# Patient Record
Sex: FEMALE | Race: WHITE | NOT HISPANIC OR LATINO | Employment: UNEMPLOYED | URBAN - NONMETROPOLITAN AREA
[De-identification: names, ages, dates, MRNs, and addresses within clinical notes are randomized per-mention and may not be internally consistent; named-entity substitution may affect disease eponyms.]

---

## 2019-11-15 ENCOUNTER — HISTORICAL (OUTPATIENT)
Dept: ADMINISTRATIVE | Facility: HOSPITAL | Age: 74
End: 2019-11-15

## 2022-11-04 DIAGNOSIS — D48.5 NEOPLASM OF UNCERTAIN BEHAVIOR OF SKIN: Primary | ICD-10-CM

## 2022-11-09 ENCOUNTER — OFFICE VISIT (OUTPATIENT)
Dept: SURGERY | Facility: CLINIC | Age: 77
End: 2022-11-09
Payer: MEDICARE

## 2022-11-09 DIAGNOSIS — D48.5 NEOPLASM OF UNCERTAIN BEHAVIOR OF SKIN: ICD-10-CM

## 2022-11-09 DIAGNOSIS — C44.92 CANCER OF SKIN, SQUAMOUS CELL: Primary | ICD-10-CM

## 2022-11-09 PROCEDURE — 99202 OFFICE O/P NEW SF 15 MIN: CPT | Mod: 25,S$GLB,, | Performed by: SURGERY

## 2022-11-09 PROCEDURE — 99202 PR OFFICE/OUTPT VISIT, NEW, LEVL II, 15-29 MIN: ICD-10-PCS | Mod: 25,S$GLB,, | Performed by: SURGERY

## 2022-11-09 PROCEDURE — 11622: ICD-10-PCS | Mod: S$GLB,,, | Performed by: SURGERY

## 2022-11-09 PROCEDURE — 11622 EXC S/N/H/F/G MAL+MRG 1.1-2: CPT | Mod: S$GLB,,, | Performed by: SURGERY

## 2022-11-09 RX ORDER — LEVOTHYROXINE SODIUM 25 UG/1
25 TABLET ORAL DAILY
COMMUNITY
Start: 2022-09-19

## 2022-11-09 RX ORDER — PANTOPRAZOLE SODIUM 40 MG/1
40 TABLET, DELAYED RELEASE ORAL DAILY
COMMUNITY
Start: 2022-08-18

## 2022-11-09 RX ORDER — AMLODIPINE BESYLATE 5 MG/1
5 TABLET ORAL DAILY
COMMUNITY
Start: 2022-09-16

## 2022-11-09 RX ORDER — UBIDECARENONE 30 MG
100 CAPSULE ORAL 3 TIMES DAILY
COMMUNITY

## 2022-11-09 RX ORDER — METOPROLOL SUCCINATE 100 MG/1
100 TABLET, EXTENDED RELEASE ORAL EVERY MORNING
COMMUNITY
Start: 2022-09-04

## 2022-11-09 RX ORDER — OXYBUTYNIN CHLORIDE 15 MG/1
15 TABLET, EXTENDED RELEASE ORAL DAILY
COMMUNITY
Start: 2022-08-18

## 2022-11-09 RX ORDER — FENOFIBRATE 134 MG/1
134 CAPSULE ORAL DAILY
COMMUNITY
Start: 2022-09-19

## 2022-11-09 NOTE — PROCEDURES
Excision squamous cell cancer anterior chest wall skin    Date/Time: 11/9/2022 2:30 PM  Performed by: Michel Gamboa MD  Authorized by: Michel Gamboa MD     Consent Done?:  Yes (Verbal)  Local anesthesia used?: Yes    Anesthesia:  Local infiltration  Local anesthetic:  Lidocaine 1% with epinephrine  Anesthetic total (ml):  9  : Malignant skin lesion.  Body area:  Neck / anterior thorax  Location modifier: Center.  Position:  Supine  Anesthesia:  Local infiltration  Local anesthetic:  Lidocaine 1% with epinephrine  Excision type:  Skin  Malignancy:  Malignant  Excision size (cm):  2  Scalpel size:  15  Incision type:  Elliptical  Specimens?: Yes     Specimens submitted to pathology.   Hemostasis was obtained.  Estimated blood loss (cc):  3  Sutures: 3-0 nylon.  Sterile dressings:  Gauze   Patient was discharged and will follow up for wound check and pathology results.

## 2022-11-09 NOTE — PROGRESS NOTES
History & Physical    SUBJECTIVE:     History of Present Illness:    77-year-old female referred by Dr. 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  Patient apparently had a couple of skin lesions removed in the lesion from the center of the chest showed to be a squamous cell carcinoma, well differentiated with the lesion extending to the base of the specimen.  She is here for re-excision.    Chief Complaint   Patient presents with    Other     Skin cancer right side         Review of patient's allergies indicates:  Review of patient's allergies indicates:   Allergen Reactions    Compazine [prochlorperazine]        Current Outpatient Medications on File Prior to Visit   Medication Sig Dispense Refill    amLODIPine (NORVASC) 5 MG tablet Take 5 mg by mouth once daily.      co-enzyme Q-10 30 mg capsule Take 100 mg by mouth 3 (three) times daily.      EUTHYROX 25 mcg tablet Take 25 mcg by mouth once daily.      fenofibrate micronized (LOFIBRA) 134 MG Cap Take 134 mg by mouth once daily.      metoprolol succinate (TOPROL-XL) 100 MG 24 hr tablet Take 100 mg by mouth every morning.      oxybutynin (DITROPAN XL) 15 MG TR24 Take 15 mg by mouth once daily.      pantoprazole (PROTONIX) 40 MG tablet Take 40 mg by mouth once daily.       No current facility-administered medications on file prior to visit.       Past Medical History:   Diagnosis Date    Hyperlipidemia     Hypertension      Past Surgical History:   Procedure Laterality Date     SECTION      CHOLECYSTECTOMY      FOOT SURGERY      KNEE SURGERY      MASTECTOMY Bilateral     REPAIR OF BILE DUCT       Family History   Problem Relation Age of Onset    Brain cancer Brother     Leukemia Brother        Social History     Socioeconomic History    Marital status: Single   Tobacco Use    Smoking status: Never    Smokeless tobacco: Never          ROS    OBJECTIVE:     There were no vitals filed for this visit.              Physical  Exam:  Physical Exam  Skin:            Comments: Two areas on the central upper chest are noted 1 on right central in 1 over the sternal area from previous shave biopsy.  It is the 1 over the sternal area that showed to be a squamous cell carcinoma.  This will be reexcised today.  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  See procedure note

## 2022-11-16 ENCOUNTER — OFFICE VISIT (OUTPATIENT)
Dept: SURGERY | Facility: CLINIC | Age: 77
End: 2022-11-16
Payer: MEDICARE

## 2022-11-16 DIAGNOSIS — Z98.890 POST-OPERATIVE STATE: Primary | ICD-10-CM

## 2022-11-16 PROCEDURE — 99024 POSTOP FOLLOW-UP VISIT: CPT | Mod: S$GLB,POP,, | Performed by: SURGERY

## 2022-11-16 PROCEDURE — 99024 PR POST-OP FOLLOW-UP VISIT: ICD-10-PCS | Mod: S$GLB,POP,, | Performed by: SURGERY

## 2022-11-16 NOTE — PROGRESS NOTES
HPI:  Postoperative revisit status post excision of malignant skin lesion from her chest wall squamous cell.  Path report shows no residual malignancy, dermal scar    PHYSICAL EXAM:  Sutures removed and Steri-Strips applied.  No redness or drainage noted  ASSESSMENT:    Stable postop  PLAN:      Revisit as needed

## 2025-03-07 ENCOUNTER — HOSPITAL ENCOUNTER (OUTPATIENT)
Dept: RADIOLOGY | Facility: HOSPITAL | Age: 80
Discharge: HOME | End: 2025-03-07
Attending: INTERNAL MEDICINE
Payer: MEDICARE

## 2025-03-07 DIAGNOSIS — Z91.81 HISTORY OF FALL: ICD-10-CM

## 2025-03-07 DIAGNOSIS — G44.209 TENSION HEADACHE: ICD-10-CM

## 2025-03-07 PROCEDURE — 70450 CT HEAD/BRAIN W/O DYE: CPT | Mod: TC

## 2025-05-19 ENCOUNTER — HOSPITAL ENCOUNTER (EMERGENCY)
Facility: HOSPITAL | Age: 80
Discharge: HOME | End: 2025-05-19
Attending: FAMILY MEDICINE
Payer: MEDICARE

## 2025-05-19 VITALS
RESPIRATION RATE: 20 BRPM | WEIGHT: 200 LBS | OXYGEN SATURATION: 98 % | DIASTOLIC BLOOD PRESSURE: 82 MMHG | SYSTOLIC BLOOD PRESSURE: 156 MMHG | BODY MASS INDEX: 33.32 KG/M2 | TEMPERATURE: 98 F | HEIGHT: 65 IN | HEART RATE: 79 BPM

## 2025-05-19 DIAGNOSIS — S43.015A ANTERIOR DISLOCATION OF LEFT SHOULDER, INITIAL ENCOUNTER: Primary | ICD-10-CM

## 2025-05-19 PROCEDURE — 96374 THER/PROPH/DIAG INJ IV PUSH: CPT

## 2025-05-19 PROCEDURE — 25000003 PHARM REV CODE 250: Performed by: FAMILY MEDICINE

## 2025-05-19 PROCEDURE — 99285 EMERGENCY DEPT VISIT HI MDM: CPT | Mod: 25

## 2025-05-19 PROCEDURE — 23650 CLTX SHO DSLC W/MNPJ WO ANES: CPT | Mod: LT

## 2025-05-19 PROCEDURE — 25000003 PHARM REV CODE 250: Performed by: NURSE PRACTITIONER

## 2025-05-19 RX ORDER — HYDROCODONE BITARTRATE AND ACETAMINOPHEN 10; 325 MG/1; MG/1
1 TABLET ORAL
Refills: 0 | Status: COMPLETED | OUTPATIENT
Start: 2025-05-19 | End: 2025-05-19

## 2025-05-19 RX ORDER — KETAMINE HYDROCHLORIDE 10 MG/ML
50 INJECTION, SOLUTION INTRAMUSCULAR; INTRAVENOUS
Status: COMPLETED | OUTPATIENT
Start: 2025-05-19 | End: 2025-05-19

## 2025-05-19 RX ORDER — HYDROCODONE BITARTRATE AND ACETAMINOPHEN 5; 325 MG/1; MG/1
1 TABLET ORAL EVERY 6 HOURS PRN
Qty: 12 TABLET | Refills: 0 | Status: SHIPPED | OUTPATIENT
Start: 2025-05-19

## 2025-05-19 RX ADMIN — HYDROCODONE BITARTRATE AND ACETAMINOPHEN 1 TABLET: 10; 325 TABLET ORAL at 02:05

## 2025-05-19 RX ADMIN — KETAMINE HYDROCHLORIDE 50 MG: 10 INJECTION INTRAMUSCULAR; INTRAVENOUS at 03:05

## 2025-05-19 NOTE — ED PROVIDER NOTES
Encounter Date: 2025       History     Chief Complaint   Patient presents with    Fall    Shoulder Pain     Pt c/o mechanical fall in utility room PTA, fell on left shoulder, obvious deformity noted. Denies hitting head or LOC, denies thinners.     79-year-old female presents with left shoulder pain after falling today at home, patient has notable deformity in left shoulder.     The history is provided by the patient. No  was used.     Review of patient's allergies indicates:   Allergen Reactions    Compazine [prochlorperazine]      Past Medical History:   Diagnosis Date    Hyperlipidemia     Hypertension      Past Surgical History:   Procedure Laterality Date     SECTION      CHOLECYSTECTOMY      FOOT SURGERY      KNEE SURGERY      MASTECTOMY Bilateral     REPAIR OF BILE DUCT       Family History   Problem Relation Name Age of Onset    Brain cancer Brother      Leukemia Brother       Social History[1]  Review of Systems   Musculoskeletal:  Positive for arthralgias and joint swelling.   All other systems reviewed and are negative.      Physical Exam     Initial Vitals [25 1402]   BP Pulse Resp Temp SpO2   (!) 200/127 78 20 97.6 °F (36.4 °C) (!) 93 %      MAP       --         Physical Exam    Nursing note and vitals reviewed.  Constitutional: She appears well-developed and well-nourished.   HENT:   Head: Normocephalic and atraumatic.   Eyes: Conjunctivae and EOM are normal. Pupils are equal, round, and reactive to light.   Neck: Neck supple.   Normal range of motion.  Cardiovascular:  Normal rate, regular rhythm, normal heart sounds and intact distal pulses.           Pulmonary/Chest: Breath sounds normal.   Abdominal: Abdomen is soft. Bowel sounds are normal.   Musculoskeletal:      Left shoulder: Deformity and tenderness present. Decreased range of motion.      Cervical back: Normal range of motion and neck supple.     Neurological: She is alert and oriented to person, place,  and time. She has normal strength.   Skin: Skin is warm and dry. Capillary refill takes less than 2 seconds.   Psychiatric: She has a normal mood and affect. Her behavior is normal. Judgment and thought content normal.         ED Course   Procedural Sedation        Date/Time: 5/19/2025 1:44 PM    Performed by: Nghia Solano MD  Authorized by: Nghia Solano MD  ASA Class: Class 2 - Mild Illness without functional impairment.  Mallampati Score: Class 4 - Soft palate not visualized.   NPO STATUS:  Date/Time of last solid: 5/19/2025 11:00 AM    Equipment: on cardiac monitor., on supplemental oxygen., on BP monitor., suction available. and airway equipment available.     Sedation type: moderate (conscious) sedation    Sedatives: ketamine  Vitals: Vital signs were monitored during sedation.  Complications: No complications.       ,shoulder reduction    Date/Time: 5/19/2025 1:44 PM    Performed by: Nghia Solano MD  Authorized by: Nghia Solano MD    Location procedure was performed:  [facility] EMERGENCY DEPARTMENT  Injury:     Injury location:  Shoulder    Location details:  Left shoulder    Injury type:  Dislocation    Dislocation type: anterior        Pre-procedure assessment:     Neurovascular status: Neurovascularly intact      Distal perfusion: normal      Neurological function: normal      Range of motion: reduced      Local anesthesia used?: No      Patient sedated?: Yes      ASA Class:  Class 2 - Mild Illness without functional impairment.    Mallampati Score:  Class 1 - Visualization of the soft palate, fauces, uvula, and anterior/posterior pillars.      Selections made in this section will also lock the Injury type section above.:     Manipulation performed?: Yes      Reduction method:  External rotation    Reduction method:  External rotation    Reduction method:  External rotation    Reduction method:  External rotation    Reduction method:  External rotation    Reduction method:  External  rotation    Immobilization:  Sling    Complications: No    Post-procedure assessment:     Neurovascular status: Neurovascularly intact      Distal perfusion: normal      Neurological function: normal      Labs Reviewed - No data to display       Imaging Results              X-Ray Shoulder 2 or More Views Left (Preliminary result)  Result time 05/19/25 17:14:26      Wet Read by Nghia Solano MD (05/19/25 17:14:26, [facility], Emergency Medicine)    Shoulder reduced, no fx                                     CT Thoracic Spine Without Contrast (Final result)  Result time 05/19/25 17:03:14      Final result by Torres Willard Jr., MD (05/19/25 17:03:14)                   Impression:      1. Advanced multilevel thoracic degenerative disc disease.  No acute thoracic compression fracture identified.      Electronically signed by: Torres Willard MD  Date:    05/19/2025  Time:    17:03               Narrative:    EXAMINATION:  CT THORACIC SPINE WITHOUT CONTRAST    CLINICAL HISTORY:  fall, back pain;    TECHNIQUE:  Non-contrast axial images were obtained through the thoracic spine.  Multiplanar and 3D reconstructions were created.    Automated exposure control was utilized    COMPARISON:  Radiographs of the thoracic spine 09/21/2016    FINDINGS:  On the sagittal reconstructed images, thoracic vertebral body heights are adequately preserved.  Thoracic spinal alignment is abnormal with scoliotic curvature and advanced kyphosis present.    Thoracic degenerative disc disease is noted throughout the thoracic spine.  There is no central spinal canal osseous bony retropulsion.    The osseous structures are demineralized secondary to osteoporosis.    The visualized portions of the lung parenchyma demonstrate pulmonary parenchymal scarring with fibrotic change present.  There is posterior dependent atelectasis noted.    Left lung base bronchiectasis is present.                                        CT Cervical Spine Without Contrast (Final result)  Result time 05/19/25 16:59:25      Final result by Torres Willard Jr., MD (05/19/25 16:59:25)                   Impression:      1. Advanced multilevel cervical degenerative disc disease.  No acute displaced cervical spinal compression fracture.  2. Soft tissue swelling at the level of the oropharynx and hypopharynx.  Correlate with the physical exam.      Electronically signed by: Torres Willard MD  Date:    05/19/2025  Time:    16:59               Narrative:    EXAMINATION:  CT CERVICAL SPINE WITHOUT CONTRAST    CLINICAL HISTORY:  Neck pain, neck injury    TECHNIQUE:  Low dose axial images, sagittal and coronal reformations were performed though the cervical spine.    Contrast was not administered.    RADIATION DOSE:  834 mGy/cm    Automated exposure control    Iterative reconstruction technique was utilized.    COMPARISON:  None    FINDINGS:  On the sagittal reconstructed images, there is loss of the normal cervical lordosis.  Cervical vertebral body heights are well preserved.  Cervical disc spaces are narrowed at all levels throughout the cervical spine with endplate marginal osteophytes present.  No acute displaced cervical spinal fracture.  No central spinal canal osseous bony retropulsion.    There is diffuse soft tissue thickening and edema within the oropharynx and hypopharynx which should be clinically evident.                                       X-Ray Shoulder 2 or More Views Left (Final result)  Result time 05/19/25 14:29:08      Final result by Miguel Farris III, MD (05/19/25 14:29:08)                   Impression:      1. Changes are present compatible with anterior dislocation of the left humeral head with subtle changes raising the question of a Bankart lesion.      Electronically signed by: Miguel Farris  Date:    05/19/2025  Time:    14:29               Narrative:    EXAMINATION:  STUDY: XR SHOULDER COMPLETE 2 OR MORE  VIEWS LEFT    CLINICAL HISTORY AND TECHNIQUE:  Fall, trauma    COMPARISON:  None    FINDINGS:  Mild-to-moderate degenerative changes are noted involving the left acromioclavicular joint and to a lesser extent the left glenohumeral joint.  There is an anterior dislocation of the left humeral head which is displaced medially at least 4-5 cm.  Two of the views demonstrate subtle calcification lateral to the glenoid fossa which raises the question a Bankart lesion.                                       Medications   HYDROcodone-acetaminophen  mg per tablet 1 tablet (1 tablet Oral Given 5/19/25 1404)   ketamine injection 50 mg (50 mg Intravenous Given by Provider 5/19/25 1522)     Medical Decision Making  Amount and/or Complexity of Data Reviewed  Radiology: ordered.  Discussion of management or test interpretation with external provider(s): Ortho consult performed. Recc. Outpt f/u    Risk  Prescription drug management.      Additional MDM:   Differential Diagnosis:   Fracture dislocation brachial plexus injury                                    Clinical Impression:  Final diagnoses:  [S43.015A] Anterior dislocation of left shoulder, initial encounter (Primary)          ED Disposition Condition    Discharge Stable          ED Prescriptions    None       Follow-up Information       Follow up With Specialties Details Why Contact Info    Nabeel Garza MD Orthopedic Surgery In 1 day  [address] [place] [address]  [phone]                   [1]   Social History  Tobacco Use    Smoking status: Never    Smokeless tobacco: Never   Substance Use Topics    Alcohol use: Never    Drug use: Never        Nghia Solano MD  05/19/25 [id number]

## 2025-05-19 NOTE — DISCHARGE INSTRUCTIONS
Called Dr. Beard's office in the morning.  Dr. Richardson, his associate,will address any further management of your shoulder situation

## 2025-07-21 ENCOUNTER — HOSPITAL ENCOUNTER (OUTPATIENT)
Dept: RADIOLOGY | Facility: HOSPITAL | Age: 80
Discharge: HOME | End: 2025-07-21
Attending: STUDENT IN AN ORGANIZED HEALTH CARE EDUCATION/TRAINING PROGRAM
Payer: MEDICARE

## 2025-07-21 DIAGNOSIS — M75.122 COMPLETE ROTATOR CUFF TEAR OR RUPTURE OF LEFT SHOULDER, NOT SPECIFIED AS TRAUMATIC: ICD-10-CM

## 2025-07-21 PROCEDURE — 73200 CT UPPER EXTREMITY W/O DYE: CPT | Mod: TC,LT

## 2025-07-28 ENCOUNTER — HOSPITAL ENCOUNTER (OUTPATIENT)
Dept: RADIOLOGY | Facility: HOSPITAL | Age: 80
Discharge: HOME | End: 2025-07-28
Attending: STUDENT IN AN ORGANIZED HEALTH CARE EDUCATION/TRAINING PROGRAM
Payer: MEDICARE

## 2025-07-28 ENCOUNTER — HOSPITAL ENCOUNTER (OUTPATIENT)
Dept: PREADMISSION TESTING | Facility: HOSPITAL | Age: 80
Discharge: HOME | End: 2025-07-28
Attending: STUDENT IN AN ORGANIZED HEALTH CARE EDUCATION/TRAINING PROGRAM
Payer: MEDICARE

## 2025-07-28 VITALS — HEIGHT: 65 IN | WEIGHT: 213 LBS | BODY MASS INDEX: 35.49 KG/M2

## 2025-07-28 DIAGNOSIS — M75.122 COMPLETE TEAR OF LEFT ROTATOR CUFF, UNSPECIFIED WHETHER TRAUMATIC: ICD-10-CM

## 2025-07-28 DIAGNOSIS — M75.122 COMPLETE TEAR OF LEFT ROTATOR CUFF, UNSPECIFIED WHETHER TRAUMATIC: Primary | ICD-10-CM

## 2025-07-28 LAB
ALBUMIN SERPL-MCNC: 4.4 G/DL (ref 3.4–4.8)
ALBUMIN/GLOB SERPL: 1.4 RATIO (ref 1.1–2)
ALP SERPL-CCNC: 50 UNIT/L (ref 40–150)
ALT SERPL-CCNC: 30 UNIT/L (ref 0–55)
ANION GAP SERPL CALC-SCNC: 14 MEQ/L
APTT PPP: 25.9 SECONDS (ref 23–29.4)
AST SERPL-CCNC: 34 UNIT/L (ref 11–45)
BACTERIA #/AREA URNS AUTO: NORMAL /HPF
BASOPHILS # BLD AUTO: 0.04 X10(3)/MCL (ref 0.01–0.08)
BASOPHILS NFR BLD AUTO: 0.5 % (ref 0.1–1.2)
BILIRUB SERPL-MCNC: 0.5 MG/DL
BILIRUB UR QL STRIP.AUTO: NEGATIVE
BUN SERPL-MCNC: 21 MG/DL (ref 9.8–20.1)
CALCIUM SERPL-MCNC: 9.5 MG/DL (ref 8.4–10.2)
CHLORIDE SERPL-SCNC: 102 MMOL/L (ref 98–107)
CLARITY UR: CLEAR
CO2 SERPL-SCNC: 25 MMOL/L (ref 23–31)
COLOR UR AUTO: YELLOW
CREAT SERPL-MCNC: 0.81 MG/DL (ref 0.55–1.02)
CREAT/UREA NIT SERPL: 26
CRP SERPL-MCNC: 1.4 MG/L
EOSINOPHIL # BLD AUTO: 0.21 X10(3)/MCL (ref 0.04–0.36)
EOSINOPHIL NFR BLD AUTO: 2.9 % (ref 0.7–7)
ERYTHROCYTE [DISTWIDTH] IN BLOOD BY AUTOMATED COUNT: 13 % (ref 11–14.5)
ERYTHROCYTE [SEDIMENTATION RATE] IN BLOOD: <1 MM/HR (ref 0–20)
GFR SERPLBLD CREATININE-BSD FMLA CKD-EPI: 75 ML/MIN/1.73/M2
GLOBULIN SER-MCNC: 3.1 GM/DL (ref 2.4–3.5)
GLUCOSE SERPL-MCNC: 84 MG/DL (ref 82–115)
GLUCOSE UR QL STRIP: NEGATIVE
HCT VFR BLD AUTO: 45 % (ref 36–48)
HGB BLD-MCNC: 15.8 G/DL (ref 11.8–16)
HGB UR QL STRIP: NEGATIVE
IMM GRANULOCYTES # BLD AUTO: 0.03 X10(3)/MCL (ref 0–0.03)
IMM GRANULOCYTES NFR BLD AUTO: 0.4 % (ref 0–0.5)
INR PPP: 1.1
KETONES UR QL STRIP: NEGATIVE
LEUKOCYTE ESTERASE UR QL STRIP: ABNORMAL
LYMPHOCYTES # BLD AUTO: 3 X10(3)/MCL (ref 1.16–3.74)
LYMPHOCYTES NFR BLD AUTO: 41.2 % (ref 20–55)
MCH RBC QN AUTO: 30.4 PG (ref 27–34)
MCHC RBC AUTO-ENTMCNC: 35.1 G/DL (ref 31–37)
MCV RBC AUTO: 86.7 FL (ref 79–99)
MONOCYTES # BLD AUTO: 0.75 X10(3)/MCL (ref 0.24–0.36)
MONOCYTES NFR BLD AUTO: 10.3 % (ref 4.7–12.5)
MRSA PCR SCRN (OHS): NOT DETECTED
NEUTROPHILS # BLD AUTO: 3.26 X10(3)/MCL (ref 1.56–6.13)
NEUTROPHILS NFR BLD AUTO: 44.7 % (ref 37–73)
NITRITE UR QL STRIP: NEGATIVE
NRBC BLD AUTO-RTO: 0 %
PH UR STRIP: 5.5 [PH]
PLATELET # BLD AUTO: 276 X10(3)/MCL (ref 140–371)
PMV BLD AUTO: 9.5 FL (ref 9.4–12.4)
POTASSIUM SERPL-SCNC: 4.1 MMOL/L (ref 3.5–5.1)
PROT SERPL-MCNC: 7.5 GM/DL (ref 5.8–7.6)
PROT UR QL STRIP: NEGATIVE
PROTHROMBIN TIME: 11.4 SECONDS (ref 9.3–11.9)
RBC # BLD AUTO: 5.19 X10(6)/MCL (ref 4–5.1)
RBC #/AREA URNS AUTO: NORMAL /HPF
SODIUM SERPL-SCNC: 141 MMOL/L (ref 136–145)
SP GR UR STRIP.AUTO: 1.02 (ref 1–1.03)
SQUAMOUS #/AREA URNS AUTO: NORMAL /HPF
UROBILINOGEN UR STRIP-ACNC: 0.2
WBC # BLD AUTO: 7.29 X10(3)/MCL (ref 4–11.5)
WBC #/AREA URNS AUTO: NORMAL /HPF

## 2025-07-28 PROCEDURE — 86140 C-REACTIVE PROTEIN: CPT | Performed by: STUDENT IN AN ORGANIZED HEALTH CARE EDUCATION/TRAINING PROGRAM

## 2025-07-28 PROCEDURE — 85730 THROMBOPLASTIN TIME PARTIAL: CPT | Performed by: STUDENT IN AN ORGANIZED HEALTH CARE EDUCATION/TRAINING PROGRAM

## 2025-07-28 PROCEDURE — 81015 MICROSCOPIC EXAM OF URINE: CPT | Mod: XB | Performed by: STUDENT IN AN ORGANIZED HEALTH CARE EDUCATION/TRAINING PROGRAM

## 2025-07-28 PROCEDURE — 80053 COMPREHEN METABOLIC PANEL: CPT | Performed by: STUDENT IN AN ORGANIZED HEALTH CARE EDUCATION/TRAINING PROGRAM

## 2025-07-28 PROCEDURE — 85025 COMPLETE CBC W/AUTO DIFF WBC: CPT | Performed by: STUDENT IN AN ORGANIZED HEALTH CARE EDUCATION/TRAINING PROGRAM

## 2025-07-28 PROCEDURE — 87086 URINE CULTURE/COLONY COUNT: CPT | Performed by: STUDENT IN AN ORGANIZED HEALTH CARE EDUCATION/TRAINING PROGRAM

## 2025-07-28 PROCEDURE — 85652 RBC SED RATE AUTOMATED: CPT | Performed by: STUDENT IN AN ORGANIZED HEALTH CARE EDUCATION/TRAINING PROGRAM

## 2025-07-28 PROCEDURE — 81003 URINALYSIS AUTO W/O SCOPE: CPT | Performed by: STUDENT IN AN ORGANIZED HEALTH CARE EDUCATION/TRAINING PROGRAM

## 2025-07-28 PROCEDURE — 85610 PROTHROMBIN TIME: CPT | Performed by: STUDENT IN AN ORGANIZED HEALTH CARE EDUCATION/TRAINING PROGRAM

## 2025-07-28 PROCEDURE — 71046 X-RAY EXAM CHEST 2 VIEWS: CPT | Mod: TC

## 2025-07-28 PROCEDURE — 87641 MR-STAPH DNA AMP PROBE: CPT

## 2025-07-28 RX ORDER — PANTOPRAZOLE SODIUM 40 MG/1
40 TABLET, DELAYED RELEASE ORAL EVERY MORNING
COMMUNITY

## 2025-07-28 NOTE — DISCHARGE INSTRUCTIONS

## 2025-07-29 ENCOUNTER — ANESTHESIA EVENT (OUTPATIENT)
Dept: SURGERY | Facility: HOSPITAL | Age: 80
End: 2025-07-29
Payer: MEDICARE

## 2025-07-30 LAB — BACTERIA UR CULT: NORMAL

## 2025-07-30 NOTE — ANESTHESIA PREPROCEDURE EVALUATION
2025  Jacqueline Leach is a 79 y.o., female.Procedure Information    Case: [id number] Date/Time: 25 0800   Procedures:      ARTHROPLASTY, SHOULDER, TOTAL, REVERSE (Left)      TENODESIS, BICEPS, OPEN (Left)   Anesthesia type: General   Diagnosis: Complete tear of left rotator cuff, unspecified whether traumatic [M75.122]   Pre-op diagnosis: Complete tear of left rotator cuff, unspecified whether traumatic [M75.122]   Location: [facility] OR  / [facility] OR   Surgeons: Nabeel Garza MD       Surgical History    Procedure Laterality Date Comment Source    SECTION       CHOLECYSTECTOMY       EYE SURGERY Bilateral  vision correction    FOOT SURGERY Bilateral      KNEE SURGERY Left      MASTECTOMY Bilateral      REPAIR OF BILE DUCT         Medical History    Diagnosis Date Comment Source   Breast cancer  left    Fall   head hit concrete missed step,  tripped fell on arm    GERD (gastroesophageal reflux disease)      Hyperlipidemia      Hypertension         Latest Reference Range & Units Most Recent   WBC 4.00 - 11.50 x10(3)/mcL 7.29  25 12:54   RBC 4.00 - 5.10 x10(6)/mcL 5.19 (H)  25 12:54   Hemoglobin 11.8 - 16.0 g/dL 15.8  25 12:54   Hematocrit 36.0 - 48.0 % 45.0  25 12:54   MCV 79.0 - 99.0 fL 86.7  25 12:54   MCH 27.0 - 34.0 pg 30.4  25 12:54   MCHC 31.0 - 37.0 g/dL 35.1  25 12:54   RDW 11.0 - 14.5 % 13.0  25 12:54   Platelet Count 140 - 371 x10(3)/mcL 276  25 12:54   MPV 9.4 - 12.4 fL 9.5  25 12:54   Neut % 37 - 73 % 44.7  25 12:54   LYMPH % 20 - 55 % 41.2  25 12:54   Mono % 4.7 - 12.5 % 10.3  25 12:54   Eos % 0.7 - 7 % 2.9  25 12:54   Basophil % 0.1 - 1.2 % 0.5  25 12:54   Immature Granulocytes 0 - 0.5 % 0.4  25 12:54   Neut # 1.56 - 6.13 x10(3)/mcL 3.26  25 12:54   Lymph # 1.16 - 3.74 x10(3)/mcL  3.00  7/28/25 12:54   Mono # 0.24 - 0.36 x10(3)/mcL 0.75 (H)  7/28/25 12:54   Eos # 0.04 - 0.36 x10(3)/mcL 0.21  7/28/25 12:54   Baso # 0.01 - 0.08 x10(3)/mcL 0.04  7/28/25 12:54   Immature Grans (Abs) 0.00 - 0.03 x10(3)/mcL 0.03  7/28/25 12:54   nRBC <=1 % 0.0  7/28/25 12:54   Sed Rate 0 - 20 mm/hr <1  7/28/25 12:54   PT 9.3 - 11.9 seconds 11.4  7/28/25 13:00   INR <5.0  1.1  7/28/25 13:00   PTT 23.0 - 29.4 seconds 25.9  7/28/25 13:00   (H): Data is abnormally high   Latest Reference Range & Units Most Recent   Sodium 136 - 145 mmol/L 141  7/28/25 11:20   Potassium 3.5 - 5.1 mmol/L 4.1  7/28/25 11:20   Chloride 98 - 107 mmol/L 102  7/28/25 11:20   CO2 23 - 31 mmol/L 25  7/28/25 11:20   Anion Gap mEq/L 14.0  7/28/25 11:20   BUN 9.8 - 20.1 mg/dL 21 (H)  7/28/25 11:20   Creatinine 0.55 - 1.02 mg/dL 0.81  7/28/25 11:20   BUN/CREAT RATIO  26  7/28/25 11:20   eGFR mL/min/1.73/m2 75  7/28/25 11:20   Glucose 82 - 115 mg/dL 84  7/28/25 11:20   Calcium 8.4 - 10.2 mg/dL 9.5  7/28/25 11:20   ALP 40 - 150 unit/L 50  7/28/25 11:20   PROTEIN TOTAL 5.8 - 7.6 gm/dL 7.5  7/28/25 11:20   Albumin 3.4 - 4.8 g/dL 4.4  7/28/25 11:20   Albumin/Globulin Ratio 1.1 - 2.0 ratio 1.4  7/28/25 11:20   BILIRUBIN TOTAL <=1.5 mg/dL 0.5  7/28/25 11:20   AST 11 - 45 unit/L 34  7/28/25 11:20   ALT 0 - 55 unit/L 30  7/28/25 11:20   CRP <5.00 mg/L 1.40  7/28/25 11:20   Globulin, Total 2.4 - 3.5 gm/dL 3.1  7/28/25 11:20   (H): Data is abnormally high  Pre-op Assessment    I have reviewed the Patient Summary Reports.     I have reviewed the Nursing Notes. I have reviewed the NPO Status.   I have reviewed the Medications.     Review of Systems  Anesthesia Hx:  No problems with previous Anesthesia             Denies Family Hx of Anesthesia complications.    Denies Personal Hx of Anesthesia complications.                    Social:  Non-Smoker       Hematology/Oncology:  Hematology Normal                       --  Cancer in past history:           bilateral    surgery       EENT/Dental:  EENT/Dental Normal           Cardiovascular:  Exercise tolerance: poor   Hypertension           hyperlipidemia    Cleared per Dr Mccall moderate risk  EF 55-60                           Pulmonary:  Pulmonary Normal                       Renal/:  Renal/ Normal                 Hepatic/GI:     GERD, well controlled                Musculoskeletal:  Arthritis          Spine Disorders:  Chronic Pain           Neurological:  Neurology Normal                                      Endocrine:        Obesity / BMI > 30  Dermatological:  Skin Normal    Psych:  Psychiatric Normal                    Physical Exam  General: Well nourished, Cooperative, Alert and Oriented    Airway:  Mallampati: III / III  Mouth Opening: Small, but > 3cm  TM Distance: Normal  Tongue: Normal  Neck ROM: Normal ROM    Dental:  Intact        Anesthesia Plan  Type of Anesthesia, risks & benefits discussed:    Anesthesia Type: Gen ETT  Intra-op Monitoring Plan: Standard ASA Monitors  Post Op Pain Control Plan: multimodal analgesia and peripheral nerve block  Induction:  IV  Airway Plan: Direct  Informed Consent: Informed consent signed with the Patient and all parties understand the risks and agree with anesthesia plan.  All questions answered. Patient consented to blood products? Yes  ASA Score: 3  Day of Surgery Review of History & Physical: H&P Update referred to the surgeon/provider.I have interviewed and examined the patient. I have reviewed the patient's H&P dated: There are no significant changes.     Ready For Surgery From Anesthesia Perspective.     .

## 2025-08-01 ENCOUNTER — ANESTHESIA (OUTPATIENT)
Dept: SURGERY | Facility: HOSPITAL | Age: 80
End: 2025-08-01
Payer: MEDICARE

## 2025-08-01 ENCOUNTER — HOSPITAL ENCOUNTER (OUTPATIENT)
Facility: HOSPITAL | Age: 80
Discharge: HOME | End: 2025-08-01
Attending: STUDENT IN AN ORGANIZED HEALTH CARE EDUCATION/TRAINING PROGRAM | Admitting: SPECIALIST
Payer: MEDICARE

## 2025-08-01 VITALS
BODY MASS INDEX: 35.44 KG/M2 | TEMPERATURE: 97 F | HEART RATE: 74 BPM | RESPIRATION RATE: 18 BRPM | WEIGHT: 213 LBS | SYSTOLIC BLOOD PRESSURE: 119 MMHG | DIASTOLIC BLOOD PRESSURE: 52 MMHG | OXYGEN SATURATION: 98 %

## 2025-08-01 DIAGNOSIS — M75.122 COMPLETE TEAR OF LEFT ROTATOR CUFF, UNSPECIFIED WHETHER TRAUMATIC: Primary | ICD-10-CM

## 2025-08-01 DIAGNOSIS — M25.511 ACUTE PAIN OF RIGHT SHOULDER: ICD-10-CM

## 2025-08-01 PROCEDURE — 71000033 HC RECOVERY, INTIAL HOUR: Performed by: STUDENT IN AN ORGANIZED HEALTH CARE EDUCATION/TRAINING PROGRAM

## 2025-08-01 PROCEDURE — 25000003 PHARM REV CODE 250: Performed by: STUDENT IN AN ORGANIZED HEALTH CARE EDUCATION/TRAINING PROGRAM

## 2025-08-01 PROCEDURE — 63600175 PHARM REV CODE 636 W HCPCS: Performed by: STUDENT IN AN ORGANIZED HEALTH CARE EDUCATION/TRAINING PROGRAM

## 2025-08-01 PROCEDURE — 71000016 HC POSTOP RECOV ADDL HR: Performed by: STUDENT IN AN ORGANIZED HEALTH CARE EDUCATION/TRAINING PROGRAM

## 2025-08-01 PROCEDURE — 71000015 HC POSTOP RECOV 1ST HR: Performed by: STUDENT IN AN ORGANIZED HEALTH CARE EDUCATION/TRAINING PROGRAM

## 2025-08-01 PROCEDURE — 37000009 HC ANESTHESIA EA ADD 15 MINS: Performed by: STUDENT IN AN ORGANIZED HEALTH CARE EDUCATION/TRAINING PROGRAM

## 2025-08-01 PROCEDURE — 63600175 PHARM REV CODE 636 W HCPCS: Performed by: NURSE ANESTHETIST, CERTIFIED REGISTERED

## 2025-08-01 PROCEDURE — 27201423 OPTIME MED/SURG SUP & DEVICES STERILE SUPPLY: Performed by: STUDENT IN AN ORGANIZED HEALTH CARE EDUCATION/TRAINING PROGRAM

## 2025-08-01 PROCEDURE — C1769 GUIDE WIRE: HCPCS | Performed by: STUDENT IN AN ORGANIZED HEALTH CARE EDUCATION/TRAINING PROGRAM

## 2025-08-01 PROCEDURE — 36000710: Performed by: STUDENT IN AN ORGANIZED HEALTH CARE EDUCATION/TRAINING PROGRAM

## 2025-08-01 PROCEDURE — C1776 JOINT DEVICE (IMPLANTABLE): HCPCS | Performed by: STUDENT IN AN ORGANIZED HEALTH CARE EDUCATION/TRAINING PROGRAM

## 2025-08-01 PROCEDURE — 25000003 PHARM REV CODE 250: Performed by: NURSE ANESTHETIST, CERTIFIED REGISTERED

## 2025-08-01 PROCEDURE — 37000008 HC ANESTHESIA 1ST 15 MINUTES: Performed by: STUDENT IN AN ORGANIZED HEALTH CARE EDUCATION/TRAINING PROGRAM

## 2025-08-01 PROCEDURE — 36000711: Performed by: STUDENT IN AN ORGANIZED HEALTH CARE EDUCATION/TRAINING PROGRAM

## 2025-08-01 PROCEDURE — 76942 ECHO GUIDE FOR BIOPSY: CPT | Performed by: NURSE ANESTHETIST, CERTIFIED REGISTERED

## 2025-08-01 DEVICE — BASEPLATE GLENOID REV 30X6.5MM: Type: IMPLANTABLE DEVICE | Site: SHOULDER | Status: FUNCTIONAL

## 2025-08-01 DEVICE — IMPLANTABLE DEVICE: Type: IMPLANTABLE DEVICE | Site: SHOULDER | Status: FUNCTIONAL

## 2025-08-01 DEVICE — IMPLANTABLE DEVICE
Type: IMPLANTABLE DEVICE | Site: SHOULDER | Status: FUNCTIONAL
Brand: TORNIER PERFORM® REVERSED AUGMENTED GLENOID

## 2025-08-01 DEVICE — IMPLANTABLE DEVICE
Type: IMPLANTABLE DEVICE | Site: SHOULDER | Status: FUNCTIONAL
Brand: TORNIER PERFORM® REVERSED GLENOID

## 2025-08-01 RX ORDER — LIDOCAINE HYDROCHLORIDE 20 MG/ML
INJECTION INTRAVENOUS
Status: DISCONTINUED | OUTPATIENT
Start: 2025-08-01 | End: 2025-08-01

## 2025-08-01 RX ORDER — MUPIROCIN 20 MG/G
OINTMENT TOPICAL
Status: DISCONTINUED | OUTPATIENT
Start: 2025-08-01 | End: 2025-08-01 | Stop reason: HOSPADM

## 2025-08-01 RX ORDER — KETOROLAC TROMETHAMINE 10 MG/1
10 TABLET, FILM COATED ORAL EVERY 6 HOURS PRN
Qty: 12 TABLET | Refills: 0 | Status: SHIPPED | OUTPATIENT
Start: 2025-08-01 | End: 2025-08-04

## 2025-08-01 RX ORDER — ONDANSETRON 4 MG/1
4 TABLET, ORALLY DISINTEGRATING ORAL EVERY 8 HOURS PRN
Qty: 21 TABLET | Refills: 0 | Status: SHIPPED | OUTPATIENT
Start: 2025-08-01 | End: 2025-08-08

## 2025-08-01 RX ORDER — NEOSTIGMINE METHYLSULFATE 1 MG/ML
INJECTION INTRAVENOUS
Status: DISCONTINUED | OUTPATIENT
Start: 2025-08-01 | End: 2025-08-01

## 2025-08-01 RX ORDER — MIDAZOLAM HYDROCHLORIDE 1 MG/ML
2 INJECTION INTRAMUSCULAR; INTRAVENOUS
Status: COMPLETED | OUTPATIENT
Start: 2025-08-01 | End: 2025-08-01

## 2025-08-01 RX ORDER — TRANEXAMIC ACID 10 MG/ML
1000 INJECTION, SOLUTION INTRAVENOUS
Status: DISCONTINUED | OUTPATIENT
Start: 2025-08-01 | End: 2025-08-01 | Stop reason: HOSPADM

## 2025-08-01 RX ORDER — HYDROCODONE BITARTRATE AND ACETAMINOPHEN 7.5; 325 MG/1; MG/1
1 TABLET ORAL EVERY 6 HOURS PRN
Refills: 0 | Status: DISCONTINUED | OUTPATIENT
Start: 2025-08-01 | End: 2025-08-01 | Stop reason: HOSPADM

## 2025-08-01 RX ORDER — VECURONIUM BROMIDE 1 MG/ML
INJECTION, POWDER, LYOPHILIZED, FOR SOLUTION INTRAVENOUS
Status: DISCONTINUED | OUTPATIENT
Start: 2025-08-01 | End: 2025-08-01

## 2025-08-01 RX ORDER — KETOROLAC TROMETHAMINE 30 MG/ML
30 INJECTION, SOLUTION INTRAMUSCULAR; INTRAVENOUS ONCE
Status: COMPLETED | OUTPATIENT
Start: 2025-08-01 | End: 2025-08-01

## 2025-08-01 RX ORDER — ASPIRIN 81 MG/1
81 TABLET ORAL DAILY
Qty: 28 TABLET | Refills: 0 | Status: SHIPPED | OUTPATIENT
Start: 2025-08-01 | End: 2025-08-29

## 2025-08-01 RX ORDER — VANCOMYCIN HYDROCHLORIDE 1 G/20ML
INJECTION, POWDER, LYOPHILIZED, FOR SOLUTION INTRAVENOUS
Status: DISCONTINUED | OUTPATIENT
Start: 2025-08-01 | End: 2025-08-01 | Stop reason: HOSPADM

## 2025-08-01 RX ORDER — SODIUM CHLORIDE, SODIUM LACTATE, POTASSIUM CHLORIDE, CALCIUM CHLORIDE 600; 310; 30; 20 MG/100ML; MG/100ML; MG/100ML; MG/100ML
INJECTION, SOLUTION INTRAVENOUS CONTINUOUS
Status: DISCONTINUED | OUTPATIENT
Start: 2025-08-01 | End: 2025-08-01 | Stop reason: HOSPADM

## 2025-08-01 RX ORDER — CEFAZOLIN SODIUM 1 G/3ML
2 INJECTION, POWDER, FOR SOLUTION INTRAMUSCULAR; INTRAVENOUS
Status: COMPLETED | OUTPATIENT
Start: 2025-08-01 | End: 2025-08-01

## 2025-08-01 RX ORDER — HYDROCODONE BITARTRATE AND ACETAMINOPHEN 7.5; 325 MG/1; MG/1
1 TABLET ORAL EVERY 6 HOURS PRN
Qty: 28 TABLET | Refills: 0 | Status: SHIPPED | OUTPATIENT
Start: 2025-08-01 | End: 2025-08-08

## 2025-08-01 RX ORDER — HYDROMORPHONE HYDROCHLORIDE 1 MG/ML
0.25 INJECTION, SOLUTION INTRAMUSCULAR; INTRAVENOUS; SUBCUTANEOUS ONCE
Refills: 0 | Status: COMPLETED | OUTPATIENT
Start: 2025-08-01 | End: 2025-08-01

## 2025-08-01 RX ORDER — ONDANSETRON HYDROCHLORIDE 2 MG/ML
INJECTION, SOLUTION INTRAVENOUS
Status: DISCONTINUED | OUTPATIENT
Start: 2025-08-01 | End: 2025-08-01

## 2025-08-01 RX ORDER — PROPOFOL 10 MG/ML
VIAL (ML) INTRAVENOUS
Status: DISCONTINUED | OUTPATIENT
Start: 2025-08-01 | End: 2025-08-01

## 2025-08-01 RX ORDER — GLYCOPYRROLATE 0.2 MG/ML
0.2 INJECTION INTRAMUSCULAR; INTRAVENOUS
Status: COMPLETED | OUTPATIENT
Start: 2025-08-01 | End: 2025-08-01

## 2025-08-01 RX ORDER — TRANEXAMIC ACID 1 G/10ML
INJECTION, SOLUTION INTRAVENOUS
Status: DISCONTINUED | OUTPATIENT
Start: 2025-08-01 | End: 2025-08-01

## 2025-08-01 RX ORDER — EPHEDRINE SULFATE 50 MG/ML
INJECTION, SOLUTION INTRAVENOUS
Status: DISCONTINUED | OUTPATIENT
Start: 2025-08-01 | End: 2025-08-01

## 2025-08-01 RX ORDER — ACETAMINOPHEN 10 MG/ML
INJECTION, SOLUTION INTRAVENOUS
Status: DISCONTINUED | OUTPATIENT
Start: 2025-08-01 | End: 2025-08-01

## 2025-08-01 RX ORDER — DEXAMETHASONE SODIUM PHOSPHATE 4 MG/ML
INJECTION, SOLUTION INTRA-ARTICULAR; INTRALESIONAL; INTRAMUSCULAR; INTRAVENOUS; SOFT TISSUE
Status: DISCONTINUED | OUTPATIENT
Start: 2025-08-01 | End: 2025-08-01

## 2025-08-01 RX ORDER — BUPIVACAINE HYDROCHLORIDE 2.5 MG/ML
INJECTION, SOLUTION EPIDURAL; INFILTRATION; INTRACAUDAL; PERINEURAL
Status: DISCONTINUED | OUTPATIENT
Start: 2025-08-01 | End: 2025-08-01 | Stop reason: HOSPADM

## 2025-08-01 RX ORDER — FAMOTIDINE 20 MG/1
20 TABLET, FILM COATED ORAL
Status: COMPLETED | OUTPATIENT
Start: 2025-08-01 | End: 2025-08-01

## 2025-08-01 RX ORDER — FENTANYL CITRATE 50 UG/ML
INJECTION, SOLUTION INTRAMUSCULAR; INTRAVENOUS
Status: DISCONTINUED | OUTPATIENT
Start: 2025-08-01 | End: 2025-08-01

## 2025-08-01 RX ADMIN — SODIUM CHLORIDE, POTASSIUM CHLORIDE, SODIUM LACTATE AND CALCIUM CHLORIDE: 600; 310; 30; 20 INJECTION, SOLUTION INTRAVENOUS at 06:08

## 2025-08-01 RX ADMIN — DEXMEDETOMIDINE 10 MCG: 200 INJECTION, SOLUTION INTRAVENOUS at 09:08

## 2025-08-01 RX ADMIN — FENTANYL CITRATE 100 MCG: 50 INJECTION, SOLUTION INTRAMUSCULAR; INTRAVENOUS at 07:08

## 2025-08-01 RX ADMIN — ACETAMINOPHEN 1000 MG: 1000 INJECTION, SOLUTION INTRAVENOUS at 10:08

## 2025-08-01 RX ADMIN — EPHEDRINE SULFATE 15 MG: 50 INJECTION, SOLUTION INTRAVENOUS at 08:08

## 2025-08-01 RX ADMIN — SODIUM CHLORIDE, POTASSIUM CHLORIDE, SODIUM LACTATE AND CALCIUM CHLORIDE: 600; 310; 30; 20 INJECTION, SOLUTION INTRAVENOUS at 12:08

## 2025-08-01 RX ADMIN — FENTANYL CITRATE 50 MCG: 50 INJECTION, SOLUTION INTRAMUSCULAR; INTRAVENOUS at 08:08

## 2025-08-01 RX ADMIN — DEXAMETHASONE SODIUM PHOSPHATE 4 MG: 4 INJECTION, SOLUTION INTRA-ARTICULAR; INTRALESIONAL; INTRAMUSCULAR; INTRAVENOUS; SOFT TISSUE at 09:08

## 2025-08-01 RX ADMIN — HYDROCODONE BITARTRATE AND ACETAMINOPHEN 1 TABLET: 7.5; 325 TABLET ORAL at 01:08

## 2025-08-01 RX ADMIN — HYDROMORPHONE HYDROCHLORIDE 0.25 MG: 1 INJECTION, SOLUTION INTRAMUSCULAR; INTRAVENOUS; SUBCUTANEOUS at 12:08

## 2025-08-01 RX ADMIN — VECURONIUM BROMIDE 2 MG: 10 INJECTION, POWDER, FOR SOLUTION INTRAVENOUS at 08:08

## 2025-08-01 RX ADMIN — FENTANYL CITRATE 50 MCG: 50 INJECTION, SOLUTION INTRAMUSCULAR; INTRAVENOUS at 07:08

## 2025-08-01 RX ADMIN — DEXMEDETOMIDINE 10 MCG: 200 INJECTION, SOLUTION INTRAVENOUS at 07:08

## 2025-08-01 RX ADMIN — EPHEDRINE SULFATE 10 MG: 50 INJECTION, SOLUTION INTRAVENOUS at 08:08

## 2025-08-01 RX ADMIN — TRANEXAMIC ACID 1000 MG: 100 INJECTION, SOLUTION INTRAVENOUS at 10:08

## 2025-08-01 RX ADMIN — VECURONIUM BROMIDE 2 MG: 10 INJECTION, POWDER, FOR SOLUTION INTRAVENOUS at 09:08

## 2025-08-01 RX ADMIN — CEFAZOLIN 2 G: 1 INJECTION, POWDER, FOR SOLUTION INTRAMUSCULAR; INTRAVENOUS at 07:08

## 2025-08-01 RX ADMIN — ONDANSETRON 8 MG: 2 INJECTION INTRAMUSCULAR; INTRAVENOUS at 10:08

## 2025-08-01 RX ADMIN — NEOSTIGMINE METHYLSULFATE 2.5 MG: 0.5 INJECTION INTRAVENOUS at 10:08

## 2025-08-01 RX ADMIN — MUPIROCIN 1 G: 20 OINTMENT TOPICAL at 06:08

## 2025-08-01 RX ADMIN — EPHEDRINE SULFATE 5 MG: 50 INJECTION, SOLUTION INTRAVENOUS at 10:08

## 2025-08-01 RX ADMIN — VECURONIUM BROMIDE 6 MG: 10 INJECTION, POWDER, FOR SOLUTION INTRAVENOUS at 07:08

## 2025-08-01 RX ADMIN — LIDOCAINE HYDROCHLORIDE 20 MG: 20 INJECTION, SOLUTION INTRAVENOUS at 07:08

## 2025-08-01 RX ADMIN — EPHEDRINE SULFATE 10 MG: 50 INJECTION, SOLUTION INTRAVENOUS at 10:08

## 2025-08-01 RX ADMIN — PROPOFOL 140 MG: 10 INJECTION, EMULSION INTRAVENOUS at 07:08

## 2025-08-01 RX ADMIN — GLYCOPYRROLATE 0.2 MG: 0.2 INJECTION INTRAMUSCULAR; INTRAVENOUS at 06:08

## 2025-08-01 RX ADMIN — FAMOTIDINE 20 MG: 20 TABLET, FILM COATED ORAL at 06:08

## 2025-08-01 RX ADMIN — KETOROLAC TROMETHAMINE 30 MG: 30 INJECTION, SOLUTION INTRAMUSCULAR; INTRAVENOUS at 11:08

## 2025-08-01 RX ADMIN — DEXMEDETOMIDINE 10 MCG: 200 INJECTION, SOLUTION INTRAVENOUS at 08:08

## 2025-08-01 RX ADMIN — TRANEXAMIC ACID 1000 MG: 100 INJECTION, SOLUTION INTRAVENOUS at 08:08

## 2025-08-01 RX ADMIN — GLYCOPYRROLATE 0.2 MG: 0.2 INJECTION, SOLUTION INTRAMUSCULAR; INTRAVITREAL at 10:08

## 2025-08-01 RX ADMIN — MIDAZOLAM HYDROCHLORIDE 2 MG: 1 INJECTION, SOLUTION INTRAMUSCULAR; INTRAVENOUS at 07:08

## 2025-08-01 NOTE — PLAN OF CARE
"PT ARRIVED TO ROOM. PT ALERT AND COMPLAINING OF PAIN. CONTINUES TO REPEAT "WHEN AM I GOING TO GET PAIN MEDICINE?" RATES PAIN 10/10. PT ABLE TO MOVE LEFT HAND AND WIGGLE FINGERS. PT REPORTS HAVING SENSATION AND FEELING TO ENTIRE LEFT ARM. STATES "THE PAIN IS IN MY LEFT UPPER ARM"   SHOULDER IMMOBILIZER IN PLACE. ICE MACHINE IN PLACE TO LEFT SHOULDER. PT REPOSITIONED IN BED X2. FREQUENT VITALS IN PLACE. FAMILY AT BEDSIDE. MD AND ANESTHESIA MADE AWARE OF PATIENT'S COMPLAINT OF SEVERE LEFT UPPER ARM PAIN. NEW ORDERS NOTED AND READ BACK. WILL CONTINUE TO MONITOR.  "

## 2025-08-01 NOTE — PLAN OF CARE
"PT UP TO AND FROM BEDSIDE COMMODE. YELLOW URINE NOTED. REPOSITIONED IN BED X2 STAFF HOB IN HIGH FOWLERS. IMMOBILIZER ADJUSTED FOR COMFORTER. ELEVATED LUE ON PILLOW. . CLEAR LIQUID DIET GIVEN.  TOLERATING WELL. PT WITH NO DISTRESS. REPORTS "FEELING BETTER" WILL CONTINUE TO MONITOR.  "

## 2025-08-01 NOTE — OP NOTE
[facility]  [facility]      Date of Procedure: 8/1/2025     Procedure:   LEFT SHOULDER REVERSE TOTAL SHOULDER ARTHROPLASTY  (64922)   LEFT SHOULDER OPEN BICEP TENODESIS (42518)     Surgeons and Role:     * Nabeel Garza MD - Primary    Assisting Surgeon: None    Pre-Operative Diagnosis:   1.Complete tear of left rotator cuff, unspecified whether traumatic [M75.122]    Post-Operative Diagnosis:   1. Complete rotator cuff tear of the subscapularis with biceps tendon subluxation, complete tear of the supraspinatus and infraspinatus tendon.  2.Left shoulder proximal biceps tendinitis, scarring and partial tearing     Operative Findings (including complications, if any):  Complete tear of the subscapularis with retraction and with subluxation of the biceps was significant scarring noted around the biceps along the proximal aspect of the groove, complete tear of the supraspinatus and infraspinatus tendon with retraction.     Implants:  Josef 25+3 base plate central screw 6.5 x 30 mm, 2 peripheral screws, glenosphere 36 standard, stem size 1+ standard perform stem, 36+ 0 retentive poly     Anesthesia: General + Regional       Description of Technical Procedures:   Patient is a 79-year-old male who sustained a left shoulder massive rotator cuff tear involving multiple tendons and we discussed nonoperative options and initially tried nonoperative management but she had significant weakness and significant lack of active range of motion.  We discussed rotator cuff repair versus reverse total shoulder arthroplasty with biceps tenodesis.  We thoroughly discussed risks and benefits of these.  She ultimately elected to proceed with reverse total shoulder arthroplasty and biceps tenodesis.  See H&P and consent for detailed discussion as well as risks discussed.     Patient was taken to the operating room supine on operating table general anesthesia provided.  2 g of Ancef and 1 g of  TXA provided.  Patient was placed in the beach chair position elevated to about 70°.  Head was appropriately padded.  The left shoulder was then prepped and draped in normal sterile fashion.  We used the true mono arm tellez.  We then held a time-out identifying left shoulder as the correct operative site.  We made a standard deltopectoral incision.  Once down in the interval we were able to find the biceps tendon and noted that it was subluxed out of the groove and there was significant scarring at the groove around the tendon.  Using 5. Tycron we tenodesed to the pectoralis major tendon at the superior portion.  Subscapularis was noted to be completely torn and retracted, we did release it but ultimately decided it was too retracted to attempt to repair.    We then were able to release the inferior capsule to externally rotate the humeral head and dislocated to exposure for cut.  We then marked and cut the humeral head with 20° of retroversion and 135° of inclination.  We then used our size 1 guide to place our central pin.  We then reamed for the stem.  We then used the awl for the distal portion.  We then placed our size 1 trial stem.  We then turned our attention to the glenoid.  We did our posterior released.  Axillary nerve was palpated.  The subscap was then released..  We then placed 2 retractors posterior and 1 anterior.  We then removed the remaining labrum.  While doing this the axillary nerve was palpated and held inferior.  We then used Hart to scrape the cartilage.  We then placed our central pin .  We then reamed in the appropriate plan position.  We then drilled for our boss.  We then drilled for our central screw.  We measured for a size 30 screw.  We then thoroughly irrigated the glenoid with Pulsavac.  We then placed our base plate at the appropriate orientation.  We then placed a superior and inferior locking screw.   The 36 standard glenosphere was then placed mallet it and then screwed on for  final tightening.  We are satisfied with our glenoid with our glenoid at this point.  We then turned our attention back to the humerus.  A 0 trial was then placed and we had good motion, tension and stability. The trial stem was noted to be stable but we were able to take it out without mallet and so we decided to place the 1+ stem. We removed the trial and irrigated the canal with Pulsavac.  Final stem was then placed at 20° of retroversion.  Final poly was placed and the joint was reduced.  We took it through a range of motion it was noted to be stable.  We then used bone tunnels to repair the subscapularis.  We used Betadine mixed saline solution to wash the joint.  We then used saline to rinse out the joint.  1 g Vancomycin powder placed in the wound.  Two 0 Vicryl stitches used to close down the deltopectoral interval with the vein superficial.  We then used 0 Vicryl to reapproximate the deep tissue.  2-0 Monocryl to close the subcutaneous tissue.  A running 3-0 Monocryl to close the skin.  Prineo dressing then used.   Aquacel over this.  Abduction sling and pillow placed.      Estimated Blood Loss (EBL): 500 mL           Implants:   Implant Name Type Inv. Item Serial No.  Lot No. LRB No. Used Action   BASEPLATE LATERALIZED 25MM +3 - [id number]  BASEPLATE LATERALIZED 25MM +3 [id number] [employer]  Left 1 Implanted   Tornier Humeral System Guide Pin    [employer] [id number] Left 1 Implanted and Explanted   SPTERE GLENOID AEQUALIS 36MM - [id number]  SPTERE GLENOID AEQUALIS 36MM [id number] [employer]  Left 1 Implanted   PIN GUIDE AEQUALIS [id number] - [id number]  PIN GUIDE AEQUALIS [id number]  [employer] [id number] Left 1 Implanted and Explanted   Tornier Perform Humeral Stem, plus, short   [id number] [employer]  Left 1 Implanted   Tornier Peform Retentive Reversed Insert   [id number]   Left 1 Implanted   BASEPLATE GLENOID REV 30X6.5MM - [id number]  BASEPLATE GLENOID REV 30X6.5MM  [employer]  Left 1  Implanted   Peripheral screw 5.0 x 26    [employer]  Left 1 Implanted   Peripheral Screw 5.0 x 30    [employer]  Left 1 Implanted       Specimens:   Specimen (24h ago, onward)       Start     Ordered    08/01/25 1031  Specimen to Pathology Orthopedics  Once        Comments: 1. Humeral head, ID ONLY     References:    Click here for ordering Quick Tip   Question Answer Comment   Service Line: Orthopedics    Specimen Source Humerus, Left    Procedure Type: Excision    Release to patient Immediate        08/01/25 1030    08/01/25 1029  Specimen to Pathology Orthopedics  RELEASE UPON ORDERING,   Status:  Canceled        References:    Click here for ordering Quick Tip   Question:  Release to patient  Answer:  Immediate    08/01/25 1029                   ID Type Source Tests Collected by Time Destination   A : Humeral head,  ID ONLY Bone Humerus, Left SPECIMEN TO PATHOLOGY (Canceled) Nabeel Garza MD 8/1/2025 10:29 AM               Condition: Good    Disposition: PACU - hemodynamically stable.    Attestation: I was present and scrubbed for the entire procedure.    Discharge Note    OUTCOME: Patient tolerated treatment/procedure well without complication and is now ready for discharge.    DISPOSITION: Home or Self Care    FINAL DIAGNOSIS:  Complete tear of left rotator cuff    FOLLOWUP: In clinic    DISCHARGE INSTRUCTIONS:    Discharge Procedure Orders   Diet general     Call MD for:  temperature >100.4     Call MD for:  persistent nausea and vomiting     Call MD for:  severe uncontrolled pain     Call MD for:  difficulty breathing, headache or visual disturbances     Call MD for:  redness, tenderness, or signs of infection (pain, swelling, redness, odor or green/yellow discharge around incision site)     Call MD for:  hives     Call MD for:  persistent dizziness or light-headedness     Call MD for:  extreme fatigue

## 2025-08-01 NOTE — PLAN OF CARE
Pt dressed with this nurse assist.    Discharge instructions provided to patient and family, allowed time for questions, verbalized understanding.    Discharged home in stable condition via wheel chair, accompanied by family, no s/s of distress noted

## 2025-08-01 NOTE — ANESTHESIA POSTPROCEDURE EVALUATION
Anesthesia Post Evaluation    Patient: Jacqueline Leach    Procedure(s) Performed: Procedure(s) (LRB):  ARTHROPLASTY, SHOULDER, TOTAL, REVERSE (Left)  TENODESIS, BICEPS, OPEN (Left)    Final Anesthesia Type: general      Patient location during evaluation: PACU  Patient participation: Yes- Able to Participate  Level of consciousness: awake and alert, awake and oriented  Post-procedure vital signs: reviewed and stable  Pain management: adequate  Airway patency: patent    PONV status at discharge: No PONV  Anesthetic complications: no      Cardiovascular status: blood pressure returned to baseline  Respiratory status: unassisted, room air and spontaneous ventilation  Hydration status: euvolemic  Follow-up not needed.              Vitals Value Taken Time   /64 08/01/25 11:19   Temp 97 08/01/25 11:19   Pulse 70 08/01/25 11:19   Resp 16 08/01/25 11:19   SpO2 95 % 08/01/25 11:19   Vitals shown include unfiled device data.      No case tracking events are documented in the log.      Pain/Damian Score: No data recorded

## 2025-08-01 NOTE — ANESTHESIA PROCEDURE NOTES
Peripheral Block    Patient location during procedure: holding area    Reason for block: primary anesthetic    Diagnosis: Left shoulder pain after dislocation   Start time: 8/1/2025 7:25 AM  Timeout: 8/1/2025 7:20 AM   End time: 8/1/2025 12:00 AM    Staffing  Authorizing Provider: Michel Katz CRNA  Performing Provider: Michel Katz CRNA    Staffing  Performed by: Michel Katz CRNA  Authorized by: Michel Katz CRNA    Preanesthetic Checklist  Completed: patient identified, IV checked, site marked, risks and benefits discussed, surgical consent, monitors and equipment checked, pre-op evaluation and timeout performed  Peripheral Block  Patient position: supine  Prep: ChloraPrep  Patient monitoring: heart rate and continuous pulse ox  Block type: interscalene  Laterality: left  Injection technique: single shot  Needle  Needle type: Echogenic   Needle gauge: 21 G  Needle length: 4 in  Needle localization: ultrasound guidance  Needle insertion depth: 10 cm   -ultrasound image captured on disc.      Additional Notes  Used total of 12 cc lidocaine 2% with epi and marcaine .5 plain mixed equally

## 2025-08-01 NOTE — PLAN OF CARE
Patient awakened to verbal stimuli and light touch. She was reoriented to setting and situation. VSS. Denies pain, sob, and no distress observed. Meets criteria for transition of care.

## 2025-08-01 NOTE — ANESTHESIA PROCEDURE NOTES
Intubation    Date/Time: 8/1/2025 7:56 AM    Performed by: Michel Katz CRNA  Authorized by: Michel Katz CRNA    Intubation:     Induction:  Intravenous    Intubated:  Postinduction    Mask Ventilation:  Easy mask    Attempts:  1    Attempted By:  CRNA    Method of Intubation:  Direct    Blade:  Pompa 2    Laryngeal View Grade: Grade I - full view of cords      Difficult Airway Encountered?: No      Airway Device:  Oral endotracheal tube    Airway Device Size:  7.0    Style/Cuff Inflation:  Cuffed    Inflation Amount (mL):  5    Tube secured:  20    Secured at:  The lips    Placement Verified By:  Capnometry    Complicating Factors:  None    Findings Post-Intubation:  BS equal bilateral and atraumatic/condition of teeth unchanged

## 2025-08-05 LAB — BEAKER SEE SCANNED REPORT: NORMAL

## (undated) DEVICE — SUT #2 TI-CRON HGS-21 30IN

## (undated) DEVICE — SUT MONOCRYL 3-0 PS-2 UND

## (undated) DEVICE — Device

## (undated) DEVICE — GOWN ORBIS LVL 4 XL 47IN

## (undated) DEVICE — KIT TRIMANO CHIN

## (undated) DEVICE — SYR IRRIGATION BULB STER 60ML

## (undated) DEVICE — DRAPE INCISE IOBAN 2 23X17IN

## (undated) DEVICE — SYR 10CC LUER LOCK

## (undated) DEVICE — SUT CTD VICRYL 1 VIL BR CTX

## (undated) DEVICE — GLOVE SENSICARE PI SURG 8

## (undated) DEVICE — TOGA FLYTE PEEL AWAY XLARGE

## (undated) DEVICE — GUIDE WIRE
Type: IMPLANTABLE DEVICE | Site: SHOULDER | Status: NON-FUNCTIONAL
Brand: TORNIER PERFORM
Removed: 2025-08-01

## (undated) DEVICE — BLADE SURG CARBON STEEL #10

## (undated) DEVICE — SUT TICRON #5

## (undated) DEVICE — GLOVE SENSICARE PI GRN 8

## (undated) DEVICE — SUT 2/0 36IN COATED VICRYL

## (undated) DEVICE — BNDG COFLEX FOAM LF2 ST 4X5YD

## (undated) DEVICE — GLOVE SENSICARE PI MICRO 6.5

## (undated) DEVICE — DRAPE STERI U-SHAPED 47X51IN

## (undated) DEVICE — SPONGE LAP 18X18 PREWASHED

## (undated) DEVICE — ELECTRODE REM PLYHSV RETURN 9

## (undated) DEVICE — PULSAVAC ZIMMER

## (undated) DEVICE — SUT 2-0 CT-1 SPIRAL 12IN

## (undated) DEVICE — COVER TABLE 77 X 96

## (undated) DEVICE — KIT TRIMANO

## (undated) DEVICE — DRESSING AQUACEL AG ADV 3.5X6

## (undated) DEVICE — TUBING SUC UNIV W/CONN 12FT

## (undated) DEVICE — GLOVE SENSICARE PI GRN 6.5

## (undated) DEVICE — ADHESIVE DERMABOND ADVANCED

## (undated) DEVICE — SOL NACL IRR 1000ML BTL

## (undated) DEVICE — COVER PROXIMA MAYO STAND

## (undated) DEVICE — NDL HYPO STD REG BVL 22GX1.5IN

## (undated) DEVICE — ELECTRODE ELECSURG NDL

## (undated) DEVICE — IMMOBILIZER SHOULDER ABD MED

## (undated) DEVICE — ELECTRODE BLADE W/SLEEVE 2.75

## (undated) DEVICE — HOLDER LAP-KIT LAPSCP INSTR

## (undated) DEVICE — CUBE COLD THERAPY POLAR CARE

## (undated) DEVICE — SYR SLIP TIP 20CC

## (undated) DEVICE — SOCKINETTE IMPERVIOUS 12X48IN

## (undated) DEVICE — DRAPE ORTH SPLIT 77X108IN

## (undated) DEVICE — SUT 2-0 12-18IN SILK

## (undated) DEVICE — BLADE ELECTRO EDGE INSULATED

## (undated) DEVICE — TOWEL OR NONABSORB ADH 17X26

## (undated) DEVICE — TOWEL OR DISP STRL BLUE 4/PK

## (undated) DEVICE — BIT DRILL PERIPH SCREW 3.2MM

## (undated) DEVICE — CLOSURE SKIN STERI STRIP 1/2X4

## (undated) DEVICE — CONTAINER SPECIMEN OR STER 4OZ